# Patient Record
Sex: FEMALE | Race: WHITE | Employment: OTHER
[De-identification: names, ages, dates, MRNs, and addresses within clinical notes are randomized per-mention and may not be internally consistent; named-entity substitution may affect disease eponyms.]

---

## 2022-07-07 RX ORDER — METOPROLOL SUCCINATE 25 MG/1
25 TABLET, EXTENDED RELEASE ORAL DAILY
COMMUNITY

## 2022-07-07 RX ORDER — PRAVASTATIN SODIUM 10 MG
TABLET ORAL
COMMUNITY
End: 2022-07-14 | Stop reason: CLARIF

## 2022-07-07 RX ORDER — BRIMONIDINE TARTRATE 2 MG/ML
SOLUTION/ DROPS OPHTHALMIC
COMMUNITY

## 2022-07-07 RX ORDER — OLMESARTAN MEDOXOMIL 40 MG/1
TABLET ORAL
COMMUNITY

## 2022-07-07 RX ORDER — FEXOFENADINE HCL 180 MG/1
180 TABLET ORAL DAILY
COMMUNITY

## 2022-07-07 RX ORDER — LATANOPROST 50 UG/ML
SOLUTION/ DROPS OPHTHALMIC
COMMUNITY

## 2022-07-07 RX ORDER — DULAGLUTIDE 0.75 MG/.5ML
INJECTION, SOLUTION SUBCUTANEOUS
COMMUNITY
Start: 2021-10-01 | End: 2022-07-14 | Stop reason: CLARIF

## 2022-07-07 RX ORDER — HYDROCODONE BITARTRATE AND ACETAMINOPHEN 5; 325 MG/1; MG/1
TABLET ORAL
COMMUNITY
End: 2022-10-19

## 2022-07-07 RX ORDER — INSULIN ASPART 100 [IU]/ML
INJECTION, SOLUTION INTRAVENOUS; SUBCUTANEOUS
COMMUNITY
End: 2022-08-10

## 2022-07-07 RX ORDER — LIDOCAINE 5 G/100G
CREAM RECTAL; TOPICAL
COMMUNITY

## 2022-07-07 RX ORDER — TIMOLOL MALEATE 5 MG/ML
SOLUTION/ DROPS OPHTHALMIC
COMMUNITY

## 2022-07-07 RX ORDER — INSULIN GLARGINE 100 [IU]/ML
44 INJECTION, SOLUTION SUBCUTANEOUS DAILY
COMMUNITY
End: 2022-08-10 | Stop reason: SDUPTHER

## 2022-08-05 PROBLEM — E78.2 MIXED HYPERLIPIDEMIA: Status: ACTIVE | Noted: 2022-08-05

## 2022-08-05 PROBLEM — I10 ESSENTIAL (PRIMARY) HYPERTENSION: Status: ACTIVE | Noted: 2022-08-05

## 2022-08-05 PROBLEM — E11.22 TYPE 2 DIABETES MELLITUS WITH CHRONIC KIDNEY DISEASE (HCC): Status: ACTIVE | Noted: 2022-08-05

## 2022-08-08 PROBLEM — Z79.899 HIGH RISK MEDICATION USE: Status: ACTIVE | Noted: 2022-08-08

## 2022-08-08 PROBLEM — K21.00 REFLUX ESOPHAGITIS: Status: ACTIVE | Noted: 2022-08-08

## 2022-08-08 PROBLEM — H40.9 GLAUCOMA: Status: ACTIVE | Noted: 2022-08-08

## 2022-08-08 PROBLEM — E78.5 DYSLIPIDEMIA: Status: RESOLVED | Noted: 2022-08-08 | Resolved: 2022-08-08

## 2022-08-08 PROBLEM — K21.00 REFLUX ESOPHAGITIS: Status: RESOLVED | Noted: 2022-08-08 | Resolved: 2022-08-08

## 2022-08-08 PROBLEM — E87.1 HYPONATREMIA: Status: ACTIVE | Noted: 2022-08-08

## 2022-08-08 PROBLEM — E55.9 VITAMIN D DEFICIENCY: Status: ACTIVE | Noted: 2022-08-08

## 2022-08-08 PROBLEM — J30.9 ALLERGIC RHINITIS: Status: ACTIVE | Noted: 2022-08-08

## 2022-08-08 PROBLEM — E11.9 TYPE 2 DIABETES MELLITUS WITHOUT COMPLICATIONS (HCC): Status: ACTIVE | Noted: 2022-08-08

## 2022-08-08 PROBLEM — E83.52 SERUM CALCIUM ELEVATED: Status: ACTIVE | Noted: 2022-08-08

## 2022-08-08 PROBLEM — M10.9 GOUT: Status: ACTIVE | Noted: 2022-08-08

## 2022-08-08 PROBLEM — E11.65 HYPERGLYCEMIA DUE TO TYPE 2 DIABETES MELLITUS (HCC): Status: ACTIVE | Noted: 2022-08-08

## 2022-08-08 PROBLEM — E03.8 SUBCLINICAL HYPOTHYROIDISM: Status: ACTIVE | Noted: 2022-08-08

## 2022-08-08 PROBLEM — N18.32 CHRONIC KIDNEY DISEASE, STAGE 3B (HCC): Status: ACTIVE | Noted: 2022-08-08

## 2022-08-08 PROBLEM — K21.9 GASTROESOPHAGEAL REFLUX DISEASE: Status: ACTIVE | Noted: 2022-08-08

## 2022-08-08 PROBLEM — L30.9 DERMATITIS: Status: ACTIVE | Noted: 2022-08-08

## 2022-08-08 PROBLEM — E78.5 DYSLIPIDEMIA: Status: ACTIVE | Noted: 2022-08-08

## 2022-09-18 PROBLEM — N18.9 CHRONIC KIDNEY DISEASE: Status: ACTIVE | Noted: 2021-05-21

## 2022-09-18 PROBLEM — R60.0 EDEMA OF LOWER EXTREMITY: Status: ACTIVE | Noted: 2021-07-29

## 2022-09-18 PROBLEM — E78.5 HYPERLIPIDEMIA: Status: ACTIVE | Noted: 2021-05-21

## 2022-10-19 PROBLEM — K21.9 GASTROESOPHAGEAL REFLUX DISEASE: Status: ACTIVE | Noted: 2022-10-19

## 2022-10-19 PROBLEM — E11.9 TYPE 2 DIABETES MELLITUS WITHOUT COMPLICATIONS (HCC): Status: RESOLVED | Noted: 2022-08-08 | Resolved: 2022-10-19

## 2022-10-19 PROBLEM — R06.00 DYSPNEA: Status: ACTIVE | Noted: 2022-10-19

## 2022-11-30 PROBLEM — G11.8 EPISODIC ATAXIA WITH SLURRED SPEECH (HCC): Status: ACTIVE | Noted: 2022-11-30

## 2022-11-30 PROBLEM — M54.9 BACKACHE: Status: ACTIVE | Noted: 2022-11-30

## 2022-11-30 PROBLEM — R47.81 EPISODIC ATAXIA WITH SLURRED SPEECH (HCC): Status: ACTIVE | Noted: 2022-11-30

## 2022-11-30 PROBLEM — Z91.89 AT RISK FOR INFECTION: Status: ACTIVE | Noted: 2022-11-30

## 2023-04-24 PROBLEM — E11.9 TYPE 2 DIABETES MELLITUS WITHOUT COMPLICATION, WITH LONG-TERM CURRENT USE OF INSULIN (HCC): Status: ACTIVE | Noted: 2022-08-05

## 2023-04-24 PROBLEM — Z79.4 TYPE 2 DIABETES MELLITUS WITHOUT COMPLICATION, WITH LONG-TERM CURRENT USE OF INSULIN (HCC): Status: ACTIVE | Noted: 2022-08-05

## 2023-06-12 PROBLEM — Z79.4 TYPE 2 DIABETES MELLITUS WITHOUT COMPLICATION, WITH LONG-TERM CURRENT USE OF INSULIN (HCC): Status: RESOLVED | Noted: 2022-08-05 | Resolved: 2023-06-12

## 2023-06-12 PROBLEM — E66.01 SEVERE OBESITY (BMI 35.0-39.9) WITH COMORBIDITY (HCC): Status: ACTIVE | Noted: 2023-06-12

## 2023-06-12 PROBLEM — E11.9 TYPE 2 DIABETES MELLITUS WITHOUT COMPLICATION, WITH LONG-TERM CURRENT USE OF INSULIN (HCC): Status: RESOLVED | Noted: 2022-08-05 | Resolved: 2023-06-12

## 2023-06-12 PROBLEM — N18.9 CHRONIC KIDNEY DISEASE: Status: RESOLVED | Noted: 2021-05-21 | Resolved: 2023-06-12

## 2023-06-12 PROBLEM — Z78.9 STATIN INTOLERANCE: Status: ACTIVE | Noted: 2023-06-12

## 2023-06-12 PROBLEM — E11.9 TYPE 2 DIABETES MELLITUS WITHOUT COMPLICATIONS (HCC): Status: RESOLVED | Noted: 2022-08-08 | Resolved: 2023-06-12

## 2023-09-18 PROBLEM — E11.22 TYPE 2 DIABETES MELLITUS WITH STAGE 3B CHRONIC KIDNEY DISEASE, WITHOUT LONG-TERM CURRENT USE OF INSULIN (HCC): Status: ACTIVE | Noted: 2022-08-08

## 2023-09-18 PROBLEM — N18.32 TYPE 2 DIABETES MELLITUS WITH STAGE 3B CHRONIC KIDNEY DISEASE, WITHOUT LONG-TERM CURRENT USE OF INSULIN (HCC): Status: ACTIVE | Noted: 2022-08-08

## 2023-12-19 PROBLEM — E66.01 SEVERE OBESITY (BMI 35.0-39.9) WITH COMORBIDITY (HCC): Status: RESOLVED | Noted: 2023-06-12 | Resolved: 2023-12-19

## 2023-12-19 PROBLEM — Z78.0 ASYMPTOMATIC MENOPAUSAL STATE: Status: ACTIVE | Noted: 2023-12-19

## 2023-12-19 PROBLEM — E78.2 MIXED HYPERLIPIDEMIA: Status: ACTIVE | Noted: 2021-05-21

## 2023-12-19 PROBLEM — R47.81 EPISODIC ATAXIA WITH SLURRED SPEECH (HCC): Status: RESOLVED | Noted: 2022-11-30 | Resolved: 2023-12-19

## 2023-12-19 PROBLEM — K21.9 GASTROESOPHAGEAL REFLUX DISEASE: Status: RESOLVED | Noted: 2022-10-19 | Resolved: 2023-12-19

## 2023-12-19 PROBLEM — E78.5 HYPERLIPIDEMIA: Status: RESOLVED | Noted: 2021-05-21 | Resolved: 2023-12-19

## 2023-12-19 PROBLEM — G11.8 EPISODIC ATAXIA WITH SLURRED SPEECH (HCC): Status: RESOLVED | Noted: 2022-11-30 | Resolved: 2023-12-19

## 2024-04-16 PROBLEM — M85.852 OSTEOPENIA OF LEFT HIP: Status: ACTIVE | Noted: 2024-04-16

## 2024-04-24 PROBLEM — Z91.199 NONCOMPLIANCE: Status: ACTIVE | Noted: 2024-04-24

## 2024-08-20 PROBLEM — M54.9 BACKACHE: Status: RESOLVED | Noted: 2022-11-30 | Resolved: 2024-08-20

## 2024-08-20 PROBLEM — L30.9 DERMATITIS: Status: RESOLVED | Noted: 2022-08-08 | Resolved: 2024-08-20

## 2024-11-19 PROBLEM — Z79.899 HIGH RISK MEDICATION USE: Status: RESOLVED | Noted: 2022-08-08 | Resolved: 2024-11-19

## 2024-11-19 PROBLEM — E83.52 SERUM CALCIUM ELEVATED: Status: RESOLVED | Noted: 2022-08-08 | Resolved: 2024-11-19

## 2024-11-19 PROBLEM — E11.65 HYPERGLYCEMIA DUE TO TYPE 2 DIABETES MELLITUS (HCC): Status: RESOLVED | Noted: 2022-08-08 | Resolved: 2024-11-19

## 2025-05-01 PROBLEM — J42 CHRONIC BRONCHITIS, UNSPECIFIED CHRONIC BRONCHITIS TYPE (HCC): Status: ACTIVE | Noted: 2025-05-01

## 2025-05-14 ENCOUNTER — CARE COORDINATION (OUTPATIENT)
Facility: CLINIC | Age: 85
End: 2025-05-14

## 2025-05-14 NOTE — CARE COORDINATION
Care Transitions Note    Follow Up Call     Patient Current Location:  [place]    Care Transition Nurse contacted the patient by telephone. Verified name and  as identifiers.    Additional needs identified to be addressed with provider     Patient advises:  - left foot and about half way up her leg is swollen   - Patient advises  that this is not new, however she is concerned that this might be a DVT   - Leg is not painful but is sensitive to touch.     She has been weighing herself:   - Weight on Monday was 204 lbs   - Weight today is 202 lbs.     Physical Therapy has been out to visit with her and is due to visit today as well.     Discussed follow up at ED, reaching out to PCP for guidance,  or scheduling an appt. For follow up.     Patient reports that she will wait until PT visit today.                   Method of communication with provider: chart routing.    Care Summary Note:     - See yellow box above in this note.     Patient advises that appointment with Hematology / Oncology provider is scheduled for 6-3-25.     Patient advises she will follow up with Pulmonary provider in approximately 2 weeks as well.     Patient denies shortness of breath and can speak in full sentences.   Patient does report a cough which is not new. Patient reports cough has been present since Covid Vaccine was received / Covid.  Patient reports she follows up with a pulmonary provider regarding this symptom.      Plan of care updates since last contact:    See yellow box above in this note.        Advance Care Planning:   Does patient have an Advance Directive: deferred at this time, will discuss on future follow up.       Remote Patient Monitoring:  Offered patient enrollment in the Remote Patient Monitoring (RPM) program for in-home monitoring:  n/a       Future Appointments         Provider Specialty Dept Phone    2025 1:45 PM (Arrive by 1:30 PM) Lucas Lopez MD Orthopedic Surgery [phone]    6/3/2025 1:30

## 2025-05-22 ENCOUNTER — CARE COORDINATION (OUTPATIENT)
Facility: CLINIC | Age: 85
End: 2025-05-22

## 2025-05-22 NOTE — CARE COORDINATION
Care Transitions Note    Final Call       Patient graduated from the Care Transitions program on 5-22-25 .  Patient/family has the ability to self-manage at this time..      Handoff:   Patient/family agreeable to [facility] outreach.          Upcoming Appointments:    Future Appointments         Provider Specialty Dept Phone    5/27/2025 1:45 PM (Arrive by 1:30 PM) Lucas Lopez MD Orthopedic Surgery [phone]    6/3/2025 1:30 PM Addie Rebollar MD Hematology and Oncology [phone]    8/4/2025 12:00 PM Jenna Winter MD Primary Care [phone]          Handoff Note    Care Transition Nurse identified needs for Ambulatory Care Management.   The following challenges have been identified:   - Patient discharged from [facility]  4-23-25.   - Patient with a history of     - a broken foot     - Cough, respiratory symptoms     - DVT     - Flushing, feeling light headed ( resolved)     - Hyponatremia      - Low Mag.      - history of diabetes      - hypertension     Patient advises ( as of 5-21-25 outreach)   - she is no longer in a \" boot\" for her broken foot.   - Swelling in her legs has decreased  - She is receiving home health visits.     Follow up:   Would [facility] please follow up with patient's report of starting Lasix.        History as provided, is not meant as an all inclusive list, please see discharge summary of 4-23-25 / history as listed in the electronic medical record for additional information as needed.      Please contact Care Transitions Nurse as needed     Referral to the Ambulatory Care Team -[facility] submitted via secure staff messaging via the [facility] pool.      Thank you,     Gege Stanley RN